# Patient Record
Sex: FEMALE | Employment: FULL TIME | URBAN - METROPOLITAN AREA
[De-identification: names, ages, dates, MRNs, and addresses within clinical notes are randomized per-mention and may not be internally consistent; named-entity substitution may affect disease eponyms.]

---

## 2020-06-12 ENCOUNTER — OFFICE VISIT (OUTPATIENT)
Dept: INTERNAL MEDICINE CLINIC | Age: 45
End: 2020-06-12

## 2020-06-12 DIAGNOSIS — Z00.00 ENCOUNTER FOR MEDICAL EXAMINATION TO ESTABLISH CARE: Primary | ICD-10-CM

## 2020-06-12 DIAGNOSIS — E78.00 HIGH CHOLESTEROL: ICD-10-CM

## 2020-06-12 DIAGNOSIS — F51.01 PRIMARY INSOMNIA: ICD-10-CM

## 2020-06-12 RX ORDER — ESCITALOPRAM OXALATE 5 MG/1
TABLET ORAL
COMMUNITY
Start: 2020-05-02 | End: 2020-06-22 | Stop reason: SDUPTHER

## 2020-06-12 NOTE — PROGRESS NOTES
CC: Establish Care and Sleep Problem      HPI:    She is a 54 y.o. female who presents for evaluation of new patient     She was  last seen on     Patient had lap band surgery 2007 - fluid was removed out of band. Does not cause pain. Eats 800-1000 calories a day. Lost 90 lbs   Gained about 40 lbs   Avoids sugar  Eats protein  Cannot tolerate meat      Hurt back last year and required PT therapy    Noted she has insomnia and not sleeping well for one year. Patient noted irritable and hot all the time  Sleeping 4-5 hours per night   Sometimes over sleeps due to exhaustion   Lexapro 5mg started last winter     During pandemic started taking Nyquil for sleep , and changed to z quil and did not work. Benadryl causes anxiety    Currently taking melatonin - last night went to sleep at 3 AM   C section     Has gynecologist   Has IUD placed 2020  Dr Alejandra Zimmer at [facility]   Having severe pre menopause symptoms    This is an established visit conducted via telemedicine with video. The patient has been instructed that this meets HIPAA criteria and acknowledges and agrees to this method of visitation. Pursuant to the emergency declaration under the [facility], 1135 waiver authority and the [employer] Act, this Virtual Visit was conducted, with patient's consent, to reduce the patient's risk of exposure to COVID-19 and provide continuity of care for an established patient. Services were provided through a video synchronous discussion virtually to substitute for in-person clinic visit. ROS:  Constitutional: negative for fevers, chills, anorexia and weight loss  Eyes:   negative for visual disturbance,  irritation  ENT:   negative for tinnitus,sore throat,nasal congestion,ear pain, sinus pain.    Respiratory:  negative for cough, hemoptysis, dyspnea,wheezing  CV:   negative for chest pain, palpitations, lower extremity edema  GI:   negative for nausea, vomiting, diarrhea, abdominal pain,melena  Genitourinary: negative for frequency, dysuria, hematuria  Musculoskel: negative for myalgias, arthralgias, back pain, muscle weakness, joint pain  Neurological:  negative for headaches, dizziness, focal weakness, numbness  Psych:             Negative for depression and anxiety    History reviewed. No pertinent past medical history. Current Outpatient Medications on File Prior to Visit   Medication Sig Dispense Refill    escitalopram oxalate (LEXAPRO) 5 mg tablet        No current facility-administered medications on file prior to visit. History reviewed. No pertinent surgical history. Family History   Problem Relation Age of Onset    Lung Cancer Father     Diabetes Paternal Grandmother     Pulmonary Fibrosis Mother     High Cholesterol Mother     Hypertension Mother      Reviewed and no changes     Social History     Socioeconomic History    Marital status:      Spouse name: Not on file    Number of children: Not on file    Years of education: Not on file    Highest education level: Not on file   Occupational History    Not on file   Social Needs    Financial resource strain: Not on file    Food insecurity     Worry: Not on file     Inability: Not on file    Transportation needs     Medical: Not on file     Non-medical: Not on file   Tobacco Use    Smoking status: Never Smoker    Smokeless tobacco: Never Used   Substance and Sexual Activity    Alcohol use: Yes    Drug use: Never    Sexual activity: Yes     Partners: Male     Birth control/protection: I.U.D.    Lifestyle    Physical activity     Days per week: Not on file     Minutes per session: Not on file    Stress: Not on file   Relationships    Social connections     Talks on phone: Not on file     Gets together: Not on file     Attends Sikhism service: Not on file     Active member of club or organization: Not on file     Attends meetings of clubs or organizations: Not on file     Relationship status: Not on file    Intimate partner violence     Fear of current or ex partner: Not on file     Emotionally abused: Not on file     Physically abused: Not on file     Forced sexual activity: Not on file   Other Topics Concern    Not on file   Social History Narrative    Not on file          There were no vitals taken for this visit. Physical Examination:   Gen: well appearing female  HEENT: normal conjunctiva, no audible congestion, patient does not see oral erythema, has MMM  Neck: patient does not feel enlarged or tender LAD or masses  Resp: normal respiratory effort, no audible wheezing. CV: patient does not feel palpitations or heart irregularity  Abd: patient does not feel abdominal tenderness or mass, patient does not notice distension  Extrem: patient does not see swelling in ankles or joints. Neuro: Alert and oriented, able to answer questions without difficulty, able to move all extremities and walk normally              Assessment/Plan:    1. Encounter for medical examination to establish care    2. Primary insomnia: onset since perimenopause symptoms started  Tried melatonin and products contining antihistamine without improvement   - suvorexant (BELSOMRA) 10 mg tablet; Take 1 Tab by mouth nightly as needed for Insomnia. Max Daily Amount: 10 mg. Dispense: 30 Tab; Refill: 2  - METABOLIC PANEL, COMPREHENSIVE  - CBC WITH AUTOMATED DIFF    3. High cholesterol  -vegetarian, following a low fat diet  - LIPID PANEL  - METABOLIC PANEL, COMPREHENSIVE  - CBC WITH AUTOMATED DIFF    4. Obesity: had lap band in 07. Eats about 1000 calories a day or less, yet struggles with weight. Patient reports recent TSH check done and normal.   Encouraged to continue with healthy diet. 5. PeriMenopause: started on lexapro 5mg. Feels the lexapro helps.  Also IUD has helped with irregular bleeding  mammogram done at [facility] - last done October 2019    Up todate on pap smear    Last blood work done November     Karen Ramesh MD    This is an established visit conducted via real time video and audio telemedicine. The patient has been instructed that this meets HIPAA criteria and acknowledges and agrees to this method of visitation.     Follow up in 4- 8 weeks for sleep

## 2020-06-12 NOTE — PROGRESS NOTES
Reviewed record in preparation for visit and have obtained necessary documentation. Identified pt with two pt identifiers(name and ). Chief Complaint   Patient presents with   [address]    Sleep Problem       Health Maintenance Due   Topic Date Due    DTaP/Tdap/Td  (1 - Tdap) 1995    Pap Test  1995    Cholesterol Test   2014       Ms. Shannon rainey has a reminder for a \"due or due soon\" health maintenance. I have asked that she discuss this further with her primary care provider for follow-up on this health maintenance. Coordination of Care Questionnaire:  :     1) Have you been to an emergency room, urgent care clinic since your last visit? no   Hospitalized since your last visit? no             2) Have you seen or consulted any other health care providers outside of [address] [place] [address] since your last visit? no  (Include any pap smears or colon screenings in this section.)    3) In the event something were to happen to you and you were unable to speak on your behalf, do you have an Advance Directive/ Living Will in place stating your wishes? NO    Do you have an Advance Directive on file? no    4) Are you interested in receiving information on Advance Directives? NO    Patient is accompanied by self I have received verbal consent from Ashleihg Simmons to discuss any/all medical information while they are present in the room.

## 2020-06-19 ENCOUNTER — TELEPHONE (OUTPATIENT)
Dept: INTERNAL MEDICINE CLINIC | Age: 45
End: 2020-06-19

## 2020-06-19 NOTE — TELEPHONE ENCOUNTER
Patient states she needs a call back to discuss getting her records requested from her former PCP. Patient also needs to advise that she does not have coverage for Belsomra thru Current insurance & is going to wait to start taking or  medication when she gets new insurance in a couple of weeks. Please call to discuss records.  Thank you

## 2020-06-22 RX ORDER — ESCITALOPRAM OXALATE 5 MG/1
5 TABLET ORAL DAILY
Qty: 30 TAB | Refills: 5 | Status: SHIPPED | OUTPATIENT
Start: 2020-06-22 | End: 2020-08-18 | Stop reason: DRUGHIGH

## 2020-06-22 NOTE — TELEPHONE ENCOUNTER
Spoke with patient. Two pt identifiers confirmed. Patient states that she is calling to provide us with the information for her previous PCP to request her records. Patient is also requesting a refill on her lexapro. Patient advised that I will send the refill request to Dr. Clarisse Fuentes and will fax over a request for her records. Pt verbalized understanding of information discussed w/ no further questions at this time.

## 2020-06-22 NOTE — TELEPHONE ENCOUNTER
Patient states she's returning your call in reference to Medical Requests being sent from Former PCP so she can possibly get medications she has been taking in the past & Dr. Cindy Issa has records to see her medication & medical history. Please call to discuss.  Thank you

## 2020-06-22 NOTE — TELEPHONE ENCOUNTER
MD Edel Lara LPN   Caller: Unspecified (3 days ago,  1:14 PM)             Lexapro refilled     Noted

## 2020-08-11 ENCOUNTER — TELEPHONE (OUTPATIENT)
Dept: INTERNAL MEDICINE CLINIC | Age: 45
End: 2020-08-11

## 2020-08-11 NOTE — TELEPHONE ENCOUNTER
Spoke with patient. Two pt identifiers confirmed. Patient states that she has been having a lot of palpitations recently. Patient states that she eats healthy and only has one caffienated beverage daily. Patient states that she is starting to get concerned. Patient states that she would prefer to be seen in the office, does not feel that a virtual visit would be beneficial.  Advised patient that unfortunately none of our physicians have any availability this week, will check with Dr. Lorena Cassidy for recommendations and will give her a call back as soon as possible. Pt verbalized understanding of information discussed w/ no further questions at this time.

## 2020-08-12 NOTE — TELEPHONE ENCOUNTER
MD Jarrod Abdi LPN    Caller: Unspecified [facility], 12:51 PM)               I can see her August 18th at 2 PM in person   No other spots on scheduled      Spoke with patient. Two pt identifiers confirmed. Patient offered an appointment with Dr. Destin De Luna on 08/18/2020. Appointment accepted. Pt verbalized understanding of information discussed w/ no further questions at this time.    Patient advised if anything changes or if unable to keep this appointment to call the office

## 2020-08-18 ENCOUNTER — OFFICE VISIT (OUTPATIENT)
Dept: INTERNAL MEDICINE CLINIC | Age: 45
End: 2020-08-18
Payer: COMMERCIAL

## 2020-08-18 VITALS
HEART RATE: 74 BPM | HEIGHT: 65 IN | RESPIRATION RATE: 18 BRPM | TEMPERATURE: 97.1 F | BODY MASS INDEX: 42.59 KG/M2 | DIASTOLIC BLOOD PRESSURE: 78 MMHG | SYSTOLIC BLOOD PRESSURE: 130 MMHG | WEIGHT: 255.6 LBS | OXYGEN SATURATION: 99 %

## 2020-08-18 DIAGNOSIS — E66.01 OBESITY, MORBID (HCC): ICD-10-CM

## 2020-08-18 DIAGNOSIS — E78.00 HIGH CHOLESTEROL: ICD-10-CM

## 2020-08-18 DIAGNOSIS — R00.2 PALPITATIONS: Primary | ICD-10-CM

## 2020-08-18 DIAGNOSIS — F41.9 ANXIETY: ICD-10-CM

## 2020-08-18 DIAGNOSIS — F51.01 PRIMARY INSOMNIA: ICD-10-CM

## 2020-08-18 PROCEDURE — 99214 OFFICE O/P EST MOD 30 MIN: CPT | Performed by: INTERNAL MEDICINE

## 2020-08-18 PROCEDURE — 93000 ELECTROCARDIOGRAM COMPLETE: CPT | Performed by: INTERNAL MEDICINE

## 2020-08-18 RX ORDER — NORETHINDRONE ACETATE AND ETHINYL ESTRADIOL AND FERROUS FUMARATE 1MG-20(21)
KIT ORAL
COMMUNITY
Start: 2020-07-28 | End: 2021-07-28

## 2020-08-18 RX ORDER — ESCITALOPRAM OXALATE 10 MG/1
10 TABLET ORAL DAILY
Qty: 30 TAB | Refills: 5 | Status: SHIPPED | OUTPATIENT
Start: 2020-08-18 | End: 2021-03-12

## 2020-08-18 NOTE — PROGRESS NOTES
Ms. Ria Marie is presenting for palpitations  CC:  Palpitations       HPI:    55 yo established in June 2020  Patient notes in past 3 weeks has palpitations, feels hot not sleeping well. Patient did not work last week and was able to catch up on her sleep. Had pap smear   Has a Mirena due to heavy bleeding   Discussed hot flashes with gynecologist she is perimenopausal .     The belsomra was not approved and changed insurance and hoping it will be approved     The palpitations come and go, does not have chest pain or dyspnea  Denies exertional chest pain    Has anxiety and for the past 4 days increased the lexapro to 10mg and notes improvement in symptoms. Review of systems:  Constitutional: negative for fever, chills, weight loss, night sweats   Eyes : negative for vision changes, eye pain and discharge  Nose and Throat: negative for tinnitus, sore throat   Cardiovascular: negative for chest pain, and shortness of breath  Respiratory: negative for shortness of breath, cough and wheezing   Gastroinstestinal: negative for abdominal pain, nausea, vomiting, diarrhea, constipation, and blood in the stool  Musculoskeletal: negative for back ache and joint ache   Genitourinary: negative for dysuria, nocturia, polyuria and hematuria   Neurologic: Negative for focal weakness, numbness or incoordination  Skin: negative for rash, pruritus  Hematologic: negative for easy bruising      History reviewed. No pertinent past medical history. History reviewed. No pertinent surgical history. Not on File    Current Outpatient Medications on File Prior to Visit   Medication Sig Dispense Refill    Junel FE 1/20, 28, 1 mg-20 mcg (21)/75 mg (7) tab TAKE 1 TABLET BY MOUTH ONCE DAILY      suvorexant (BELSOMRA) 10 mg tablet Take 1 Tab by mouth nightly as needed for Insomnia. Max Daily Amount: 10 mg. 30 Tab 2     No current facility-administered medications on file prior to visit.         family history includes Diabetes in her paternal grandmother; High Cholesterol in her mother; Hypertension in her mother; Farrell Rivas in her father; Pulmonary Fibrosis in her mother. Social History     Socioeconomic History    Marital status:      Spouse name: Not on file    Number of children: Not on file    Years of education: Not on file    Highest education level: Not on file   Occupational History    Not on file   Social Needs    Financial resource strain: Not on file    Food insecurity     Worry: Not on file     Inability: Not on file    Transportation needs     Medical: Not on file     Non-medical: Not on file   Tobacco Use    Smoking status: Never Smoker    Smokeless tobacco: Never Used   Substance and Sexual Activity    Alcohol use: Yes    Drug use: Never    Sexual activity: Yes     Partners: Male     Birth control/protection: I.U.D. Lifestyle    Physical activity     Days per week: Not on file     Minutes per session: Not on file    Stress: Not on file   Relationships    Social connections     Talks on phone: Not on file     Gets together: Not on file     Attends Lutheran service: Not on file     Active member of club or organization: Not on file     Attends meetings of clubs or organizations: Not on file     Relationship status: Not on file    Intimate partner violence     Fear of current or ex partner: Not on file     Emotionally abused: Not on file     Physically abused: Not on file     Forced sexual activity: Not on file   Other Topics Concern    Not on file   Social History Narrative    Not on file       Visit Vitals  /78 (BP 1 Location: Right arm, BP Patient Position: Sitting)   Pulse 74   Temp 97.1 °F (36.2 °C) (Axillary)   Resp 18   Ht 5' 5\" (1.651 m)   Wt 255 lb 9.6 oz (115.9 kg)   SpO2 99%   BMI 42.53 kg/m²     General:  Well appearing female no acute distress  HEENT:   PERRL,normal conjunctiva. External ear and canals normal, TMs normal.  Hearing normal to voice.   Nose without edema or discharge, normal septum. Lips, teeth, gums normal.  Oropharynx: no erythema, no exudates, no lesions, normal tongue. Neck:  Supple. Thyroid normal size, nontender, without nodules. No carotid bruit. No masses or lymphadenopathy  Respiratory: no respiratory distress,  no wheezing, no rhonchi, no rales. No chest wall tenderness. Cardiovascular:  RRR, normal S1S2, no murmur. Gastrointestinal: normal bowel sounds, soft, nontender, without masses. No hepatosplenomegaly. Extremities +2 pulses, no edema, normal sensation   Musculoskeletal:  Normal gait. Normal digits and nails. Normal strength and tone, no atrophy, and no abnormal movement. Skin:  No rash, no lesions, no ulcers. Skin warm, normal turgor, without induration or nodules. Neuro:  A and OX4, fluent speech, cranial nerves normal 2-12. Sensation normal to light touch. DTR symmetrical  Psych:  Normal affect      No results found for: WBC, WBCLT, HGBPOC, HGB, HGBP, HCTPOC, HCT, PHCT, RBCH, PLT, MCV, HGBEXT, HCTEXT, PLTEXT, HGBEXT, HCTEXT, PLTEXT  No results found for: NA, K, CL, CO2, AGAP, GLU, BUN, CREA, BUCR, GFRAA, GFRNA, CA, GFRAA  No results found for: CHOL, CHOLPOCT, CHOLX, CHLST, CHOLV, HDL, HDLPOC, HDLP, LDL, LDLCPOC, LDLC, DLDLP, VLDLC, VLDL, TGLX, TRIGL, TRIGP, TGLPOCT, CHHD, CHHDX  No results found for: TSH, TSH2, TSH3, TSHP, TSHEXT, TSHEXT  No results found for: HBA1C, HGBE8, LYK2TYOK, AFR8CUJC, DNO1WVYS  No results found for: VITD3, XQVID2, XQVID3, XQVID, VD3RIA          EKG normal sinus rhythm , normal intervals      Assessment and Plan:     1. Palpitations: suspect anxiety, normal EKG and normal exam  - AMB POC EKG ROUTINE W/ 12 LEADS, INTER & REP  - TSH AND FREE T4    2. Primary insomnia-Tried melatonin and products contining antihistamine without improvement     - [facility] approval pending     3. Anxiety  Not well controlled increase lexapro to 10mg   - escitalopram oxalate (LEXAPRO) 10 mg tablet; Take 1 Tab by mouth daily. Dispense: 30 Tab; Refill: 5    4. High cholesterol  Pending lipid panel    5. PeriMenopause: started on lexapro 5mg. Feels the lexapro helps. Also IUD has helped with irregular bleeding  mammogram done at [facility] - last done October 2019    6.  Obesity: hx of lap band, reports healthy diet   Follow-up and Dispositions    · Return in about 5 months (around 1/18/2021) for palpitations and anxiety .                 Sindhu Vazquez MD

## 2020-08-18 NOTE — PROGRESS NOTES
Reviewed record in preparation for visit and have obtained necessary documentation. Identified pt with two pt identifiers(name and ). Chief Complaint   Patient presents with    Palpitations       Health Maintenance Due   Topic Date Due    DTaP/Tdap/Td  (1 - Tdap) 1995    Pap Test  1995    Cholesterol Test   2014    Flu Vaccine  2020       Ms. Shannon rainey has a reminder for a \"due or due soon\" health maintenance. I have asked that she discuss this further with her primary care provider for follow-up on this health maintenance. Coordination of Care Questionnaire:  :     1) Have you been to an emergency room, urgent care clinic since your last visit? no   Hospitalized since your last visit? no             2) Have you seen or consulted any other health care providers outside of [address] [place] [address] since your last visit? no  (Include any pap smears or colon screenings in this section.)    3) In the event something were to happen to you and you were unable to speak on your behalf, do you have an Advance Directive/ Living Will in place stating your wishes? NO    Do you have an Advance Directive on file? no    4) Are you interested in receiving information on Advance Directives? NO    Patient is accompanied by self I have received verbal consent from fredis [facility] to discuss any/all medical information while they are present in the room.

## 2020-08-21 DIAGNOSIS — F51.01 PRIMARY INSOMNIA: ICD-10-CM

## 2020-08-21 NOTE — TELEPHONE ENCOUNTER
Future Appointments:  No future appointments. Last Appointment With Me:  8/18/2020     Last Appointment My Department:  8/18/2020    Requested Prescriptions     Pending Prescriptions Disp Refills    suvorexant (BELSOMRA) 10 mg tablet 30 Tab 2     Sig: Take 1 Tab by mouth nightly as needed for Insomnia. Max Daily Amount: 10 mg.

## 2021-02-09 ENCOUNTER — TELEPHONE (OUTPATIENT)
Dept: INTERNAL MEDICINE CLINIC | Age: 46
End: 2021-02-09

## 2021-02-09 DIAGNOSIS — Z12.11 SCREENING FOR COLON CANCER: Primary | ICD-10-CM

## 2021-02-09 NOTE — TELEPHONE ENCOUNTER
Caller's first and last name: Pt       Reason for call: Would like to be referred to a gastro dr for colonoscopy       Callback required yes/no and why: yes,       Best contact number(s): [phone]       Details to clarify the request: N/A       Ghada Sandhu

## 2021-02-17 ENCOUNTER — TELEPHONE (OUTPATIENT)
Dept: INTERNAL MEDICINE CLINIC | Age: 46
End: 2021-02-17

## 2021-02-17 NOTE — TELEPHONE ENCOUNTER
----- Message from [facility] sent at 2/17/2021  9:00 AM EST -----  Regarding: Dr. Gordo Condon Message/Vendor Calls    Caller's first and last name: Pt      Reason for call: Sent message last week for request for referral to GI doctor and has gotten no response.       Callback required yes/no and why: Yes      Best contact number(s): [phone]      Message from [facility]

## 2021-02-17 NOTE — TELEPHONE ENCOUNTER
Efrem Sofia [address] [facility]  [employer]          Phone:  Fax:      [phone]        Left message for patient with the above referral information.   Patient advised to contact the office if she has any issues getting an appointment or if has any additional questions

## 2021-07-09 ENCOUNTER — TELEPHONE (OUTPATIENT)
Dept: INTERNAL MEDICINE CLINIC | Age: 46
End: 2021-07-09

## 2021-07-09 DIAGNOSIS — R00.2 PALPITATIONS: Primary | ICD-10-CM

## 2021-07-09 NOTE — TELEPHONE ENCOUNTER
Patient states she needs a call back to get the Name of a Cardiologist that Dr. Cam Campa would refer her to. Please call to discuss. Patient stats a detailed message can be left on her voice mail.  Thank you

## 2021-07-09 NOTE — TELEPHONE ENCOUNTER
This attempted to contact patient in reference to cardiology recommendation per Dr. Tom Concepcion. This writer was not able to reach patient at this time, a voicemail message was left for patient to contact the office.

## 2021-07-28 ENCOUNTER — CLINICAL SUPPORT (OUTPATIENT)
Dept: CARDIOLOGY CLINIC | Age: 46
End: 2021-07-28
Payer: COMMERCIAL

## 2021-07-28 ENCOUNTER — OFFICE VISIT (OUTPATIENT)
Dept: CARDIOLOGY CLINIC | Age: 46
End: 2021-07-28

## 2021-07-28 VITALS
BODY MASS INDEX: 43.45 KG/M2 | HEART RATE: 93 BPM | SYSTOLIC BLOOD PRESSURE: 130 MMHG | OXYGEN SATURATION: 98 % | WEIGHT: 260.8 LBS | DIASTOLIC BLOOD PRESSURE: 80 MMHG | HEIGHT: 65 IN | RESPIRATION RATE: 18 BRPM

## 2021-07-28 DIAGNOSIS — R00.2 PALPITATIONS: ICD-10-CM

## 2021-07-28 DIAGNOSIS — R06.09 DOE (DYSPNEA ON EXERTION): Primary | ICD-10-CM

## 2021-07-28 DIAGNOSIS — R07.89 CHEST DISCOMFORT: ICD-10-CM

## 2021-07-28 DIAGNOSIS — R06.09 DOE (DYSPNEA ON EXERTION): ICD-10-CM

## 2021-07-28 DIAGNOSIS — Z76.89 ENCOUNTER TO ESTABLISH CARE: ICD-10-CM

## 2021-07-28 PROCEDURE — 99204 OFFICE O/P NEW MOD 45 MIN: CPT | Performed by: INTERNAL MEDICINE

## 2021-07-28 PROCEDURE — 93000 ELECTROCARDIOGRAM COMPLETE: CPT | Performed by: INTERNAL MEDICINE

## 2021-07-28 PROCEDURE — 93227 XTRNL ECG REC<48 HR R&I: CPT | Performed by: INTERNAL MEDICINE

## 2021-07-28 PROCEDURE — 93225 XTRNL ECG REC<48 HRS REC: CPT | Performed by: INTERNAL MEDICINE

## 2021-07-28 NOTE — LETTER
7/28/2021    Patient: Aime Luo   YOB: 1975   Date of Visit: 7/28/2021     Elisabeth Kidd MD  [address]    Dear Elisabeth Kidd MD,      Thank you for referring Ms. Aime Luo to [facility] for evaluation. My notes for this consultation are attached. If you have questions, please do not hesitate to call me. I look forward to following your patient along with you.       Sincerely,    Seth Nick MD

## 2021-07-28 NOTE — PROGRESS NOTES
1. Have you been to the ER, urgent care clinic since your last visit? Hospitalized since your last visit? No    2. Have you seen or consulted any other health care providers outside of the [address] [place] [address] since your last visit? Include any pap smears or colon screening. No     Chief Complaint   Patient presents with    Palpitations     Pain and heaviness in shoulders, SOB, fatgiue with mild exertion.

## 2021-07-28 NOTE — PROGRESS NOTES
Leslie Bonner NP         Subjective/HPI:     Robb Walsh is a 51 y.o. female is here for new patient consultation. The patient has medical hx significant for obesity s/p lap band surgery and anxiety. She reports she is having more frequent episodes of chest pressure that is radiating down her left or right arm. It is not exertionally related, happens randomly. She has noticed worsening QUINTANA with shorter distances. Having to take rest breaks when active which is new. She also feels like she has to take deep breaths often. She is having racing heart rates that occur randomly. She reports this has been going on now for a couple months. She denies changes in stress level, caffeine intake. She did have an increase in her Lexapro to help with her night sweats/perimenopausal symptoms which helped minimally. She reports walking a mile every day consistently and she is able to tolerate this without symptoms. She is concerned d/t her mother having pulmonary fibrosis and required pacemaker. Previous cardiac evaluation includes none. Current Outpatient Medications   Medication Sig    escitalopram oxalate (LEXAPRO) 10 mg tablet Take 1 tablet by mouth once daily     No current facility-administered medications for this visit. Vitals:    07/28/21 1037   BP: 130/80   Pulse: 93   Resp: 18   SpO2: 98%   Weight: 260 lb 12.8 oz (118.3 kg)   Height: 5' 5\" (1.651 m)       I have reviewed the nurses notes, vitals, problem list, allergy list, medical history, family, social history and medications. Review of Symptoms:  General: Pt denies excessive weight gain or loss. Pt is able to conduct ADL's.+fatigue  HEENT: Denies blurred vision, headaches, epistaxis and difficulty swallowing. Respiratory: + shortness of breath, QUINTANA, denies wheezing or stridor.   Cardiovascular: +chest pressure/heaviness with radiation down left or right arm.+ palpitations, denies edema or PND  Gastrointestinal: Denies poor appetite, indigestion, abdominal pain or blood in stool  Urinary: Denies dysuria, pyuria  Musculoskeletal: Denies pain or swelling from muscles or joints  Neurologic: Denies tremor, paresthesias, or sensory motor disturbance  Skin: Denies rash, itching or texture change. Psych: Denies depression        Physical Exam:      General: Well developed, cooperative, alert in no acute distress, appears states age. HEENT: Supple, No carotid bruits, no JVD, trach is midline. PERRL, EOM intact  Heart:  Normal S1/S2 negative S3 or S4. Regular, no murmur, gallop or rub. Respiratory: Clear bilaterally x 4, no wheezing or rales  Abdomen:   Soft, non-tender, no masses, bowel sounds are active. Extremities:  No edema, normal cap refill, no cyanosis, atraumatic. Neuro: A&Ox3, speech clear, gait stable. Skin: Skin color is normal. No rashes or lesions. Non diaphoretic  Vascular: 2+ pulses symmetric in all extremities    Cardiographics    ECG: Sinus  Rhythm   Low voltage in precordial leads.    -Decreasing R-wave progression -may be secondary to pulmonary disease        Assessment:      Diagnoses and all orders for this visit:    1. QUINTANA (dyspnea on exertion)  -     CARDIAC HOLTER MONITOR; Future  -     ECHO ADULT COMPLETE; Future  -     METABOLIC PANEL, COMPREHENSIVE  -     MAGNESIUM  -     CBC WITH AUTOMATED DIFF  -     TSH REFLEX TO T4  -     ECHO STRESS; Future    2. Chest discomfort  -     CARDIAC HOLTER MONITOR; Future  -     ECHO ADULT COMPLETE; Future  -     METABOLIC PANEL, COMPREHENSIVE  -     MAGNESIUM  -     CBC WITH AUTOMATED DIFF  -     TSH REFLEX TO T4  -     ECHO STRESS; Future    3. Palpitations  -     CARDIAC HOLTER MONITOR; Future  -     ECHO ADULT COMPLETE; Future  -     METABOLIC PANEL, COMPREHENSIVE  -     MAGNESIUM  -     CBC WITH AUTOMATED DIFF  -     TSH REFLEX TO T4  -     ECHO STRESS; Future    4. BMI 40.0-44.9, adult ([facility] [address]  -     CARDIAC HOLTER MONITOR;  Future  -     ECHO ADULT COMPLETE; Future  -     METABOLIC PANEL, COMPREHENSIVE  -     MAGNESIUM  -     CBC WITH AUTOMATED DIFF  -     TSH REFLEX TO T4  -     ECHO STRESS; Future    5. Encounter to establish care  -     AMB POC EKG ROUTINE W/ 12 LEADS, INTER & REP  -     CARDIAC HOLTER MONITOR; Future  -     ECHO ADULT COMPLETE; Future  -     METABOLIC PANEL, COMPREHENSIVE  -     MAGNESIUM  -     CBC WITH AUTOMATED DIFF  -     TSH REFLEX TO T4  -     ECHO STRESS; Future      Specialty Problems     None          ICD-10-CM ICD-9-CM    1. QUINTANA (dyspnea on exertion)  R06.00 786.09 CARDIAC HOLTER MONITOR      ECHO ADULT COMPLETE      METABOLIC PANEL, COMPREHENSIVE      MAGNESIUM      CBC WITH AUTOMATED DIFF      TSH REFLEX TO T4      ECHO STRESS   2. Chest discomfort  R07.89 786.59 CARDIAC HOLTER MONITOR      ECHO ADULT COMPLETE      METABOLIC PANEL, COMPREHENSIVE      MAGNESIUM      CBC WITH AUTOMATED DIFF      TSH REFLEX TO T4      ECHO STRESS   3. Palpitations  R00.2 785.1 CARDIAC HOLTER MONITOR      ECHO ADULT COMPLETE      METABOLIC PANEL, COMPREHENSIVE      MAGNESIUM      CBC WITH AUTOMATED DIFF      TSH REFLEX TO T4      ECHO STRESS   4. BMI 40.0-44.9, adult ([facility])  Z68.41 V85.41 CARDIAC HOLTER MONITOR      ECHO ADULT COMPLETE      METABOLIC PANEL, COMPREHENSIVE      MAGNESIUM      CBC WITH AUTOMATED DIFF      TSH REFLEX TO T4      ECHO STRESS   5. Encounter to establish care  Z76.89 V65.8 AMB POC EKG ROUTINE W/ 12 LEADS, INTER & REP      CARDIAC HOLTER MONITOR      ECHO ADULT COMPLETE      METABOLIC PANEL, COMPREHENSIVE      MAGNESIUM      CBC WITH AUTOMATED DIFF      TSH REFLEX TO T4      ECHO STRESS         PLAN:  1. QUINTANA/SOB  Reports worsening QUINTANA and feeling like she has to take deep breaths. EKG SR with low voltage and decreasing R wave progression. Mother with pulm fibrosis. No previous cardiac evaluation. Obtain echo    2. Chest presure/heaviness with radiation down arms  Reports episodes of chest pressure and heaviness that radiates down left or right arms.  Risk factors for CAD include age, female, and obesity. EKG with changes per above. Obtain stress echo. 3. Palpitations  Rapid HR that is occurring randomly and with SOB. Obtain 5 day holter. Obtain labs including CBC, CMP, Mag and TSH    Patient seen and examined by me with nurse practitioner. Debbie Ca personally performed all components of the history, physical, and medical decision making and agree with the assessment and plan with minor modifications as noted. Agree with monitor for palps, echo for QUINTANA, and stress echo for chest discomfort. F/U after studies    Leslie Gamez MD    Patient was made aware during visit today that all testing completed would be instantaneously available on their MyChart for review. Discussed that these results will be made available to the provider at the same time. They were advised to wait at least 3 business days to allow for provider's interpretation of results with follow-up before calling our office with concerns about their results.

## 2021-08-09 ENCOUNTER — TELEPHONE (OUTPATIENT)
Dept: CARDIOLOGY CLINIC | Age: 46
End: 2021-08-09

## 2021-08-09 NOTE — TELEPHONE ENCOUNTER
----- Message from Liz Ardon NP sent at 8/9/2021  8:31 AM EDT -----  Monitor shows all normal heart rhythm. She had rare early beats from the top chamber of her heart which is considered a normal finding and is benign. We all have these.

## 2021-08-09 NOTE — PROGRESS NOTES
Monitor shows all normal heart rhythm. She had rare early beats from the top chamber of her heart which is considered a normal finding and is benign. We all have these.

## 2021-08-09 NOTE — TELEPHONE ENCOUNTER
Spoke with patient. Verified patient with two patient identifiers. Discussed monitor results with pt. All okay. Patient verbalized understanding.

## 2021-08-09 NOTE — TELEPHONE ENCOUNTER
----- Message from Yonis Antoine NP sent at 8/9/2021  8:31 AM EDT -----  Monitor shows all normal heart rhythm. She had rare early beats from the top chamber of her heart which is considered a normal finding and is benign. We all have these.

## 2021-08-25 ENCOUNTER — ANCILLARY PROCEDURE (OUTPATIENT)
Dept: CARDIOLOGY CLINIC | Age: 46
End: 2021-08-25
Payer: COMMERCIAL

## 2021-08-25 VITALS
HEIGHT: 65 IN | DIASTOLIC BLOOD PRESSURE: 80 MMHG | SYSTOLIC BLOOD PRESSURE: 130 MMHG | BODY MASS INDEX: 43.32 KG/M2 | WEIGHT: 260 LBS

## 2021-08-25 DIAGNOSIS — R00.2 PALPITATIONS: ICD-10-CM

## 2021-08-25 DIAGNOSIS — R06.09 DOE (DYSPNEA ON EXERTION): ICD-10-CM

## 2021-08-25 DIAGNOSIS — R07.89 CHEST DISCOMFORT: ICD-10-CM

## 2021-08-25 DIAGNOSIS — Z76.89 ENCOUNTER TO ESTABLISH CARE: ICD-10-CM

## 2021-08-25 PROCEDURE — 93306 TTE W/DOPPLER COMPLETE: CPT | Performed by: INTERNAL MEDICINE

## 2021-08-26 ENCOUNTER — TELEPHONE (OUTPATIENT)
Dept: CARDIOLOGY CLINIC | Age: 46
End: 2021-08-26

## 2021-08-26 LAB
ECHO AO ASC DIAM: 3.72 CM
ECHO AO ROOT DIAM: 2.99 CM
ECHO AV PEAK GRADIENT: 4.71 MMHG
ECHO AV PEAK VELOCITY: 108.56 CM/S
ECHO EST RA PRESSURE: 3 MMHG
ECHO LA AREA 4C: 12.69 CM2
ECHO LA MAJOR AXIS: 3.45 CM
ECHO LA MINOR AXIS: 1.56 CM
ECHO LA VOL 2C: 15.17 ML (ref 22–52)
ECHO LA VOL 4C: 25.08 ML (ref 22–52)
ECHO LA VOL BP: 24.71 ML (ref 22–52)
ECHO LA VOL/BSA BIPLANE: 11.18 ML/M2 (ref 16–28)
ECHO LA VOLUME INDEX A2C: 6.86 ML/M2 (ref 16–28)
ECHO LA VOLUME INDEX A4C: 11.35 ML/M2 (ref 16–28)
ECHO LV E' LATERAL VELOCITY: 12.85 CM/S
ECHO LV E' SEPTAL VELOCITY: 6.16 CM/S
ECHO LV GLOBAL LONGITUDINAL STRAIN (GLS): -15.5 PERCENT
ECHO LV INTERNAL DIMENSION DIASTOLIC: 4.49 CM (ref 3.9–5.3)
ECHO LV INTERNAL DIMENSION SYSTOLIC: 2.84 CM
ECHO LV ISOVOLUMETRIC RELAXATION TIME (IVRT): 64.7 MS
ECHO LV IVSD: 0.9 CM (ref 0.6–0.9)
ECHO LV MASS 2D: 129.4 G (ref 67–162)
ECHO LV MASS INDEX 2D: 58.5 G/M2 (ref 43–95)
ECHO LV POSTERIOR WALL DIASTOLIC: 0.87 CM (ref 0.6–0.9)
ECHO LVOT PEAK GRADIENT: 3.63 MMHG
ECHO LVOT PEAK VELOCITY: 95.2 CM/S
ECHO MV "A" WAVE DURATION: 137.01 MS
ECHO MV A VELOCITY: 67.84 CM/S
ECHO MV E DECELERATION TIME (DT): 304.5 MS
ECHO MV E VELOCITY: 47.91 CM/S
ECHO MV E/A RATIO: 0.71
ECHO MV E/E' LATERAL: 3.73
ECHO MV E/E' RATIO (AVERAGED): 5.75
ECHO MV E/E' SEPTAL: 7.78
ECHO RIGHT VENTRICULAR SYSTOLIC PRESSURE (RVSP): 14.36 MMHG
ECHO RV TAPSE: 2.23 CM (ref 1.5–2)
ECHO TV REGURGITANT MAX VELOCITY: 168.53 CM/S
ECHO TV REGURGITANT PEAK GRADIENT: 11.36 MMHG
GLOBAL LONGITUDINAL STRAIN 2 CHAMBER: -16.2 PERCENT
GLOBAL LONGITUDINAL STRAIN 4 CHAMBER: -15.2 PERCENT
GLOBAL LONGITUDINAL STRAIN LONG AXIS: -15 PERCENT
LA VOL DISK BP: 23.05 ML (ref 22–52)

## 2021-08-26 NOTE — TELEPHONE ENCOUNTER
----- Message from Adele German NP sent at 8/26/2021  3:44 PM EDT -----  Echo showed normal pumping heart strength, valves looks goof and healthy.

## 2021-08-30 ENCOUNTER — ANCILLARY PROCEDURE (OUTPATIENT)
Dept: CARDIOLOGY CLINIC | Age: 46
End: 2021-08-30
Payer: COMMERCIAL

## 2021-08-30 VITALS
SYSTOLIC BLOOD PRESSURE: 130 MMHG | HEIGHT: 65 IN | WEIGHT: 260 LBS | BODY MASS INDEX: 43.32 KG/M2 | DIASTOLIC BLOOD PRESSURE: 80 MMHG

## 2021-08-30 DIAGNOSIS — R06.09 DOE (DYSPNEA ON EXERTION): ICD-10-CM

## 2021-08-30 DIAGNOSIS — R07.89 CHEST DISCOMFORT: ICD-10-CM

## 2021-08-30 DIAGNOSIS — Z76.89 ENCOUNTER TO ESTABLISH CARE: ICD-10-CM

## 2021-08-30 DIAGNOSIS — R00.2 PALPITATIONS: ICD-10-CM

## 2021-08-30 LAB
STRESS BASELINE DIAS BP: 85 MMHG
STRESS BASELINE HR: 92 BPM
STRESS BASELINE SYS BP: 125 MMHG
STRESS ESTIMATED WORKLOAD: 9 METS
STRESS EXERCISE DUR MIN: NORMAL MIN:SEC
STRESS O2 SAT PEAK: 94 %
STRESS O2 SAT REST: 97 %
STRESS PEAK DIAS BP: 95 MMHG
STRESS PEAK SYS BP: 185 MMHG
STRESS PERCENT HR ACHIEVED: 96 %
STRESS POST PEAK HR: 166 BPM
STRESS RATE PRESSURE PRODUCT: NORMAL BPM*MMHG
STRESS ST DEPRESSION: 0 MM
STRESS ST ELEVATION: 0 MM
STRESS STAGE 1 BP: NORMAL MMHG
STRESS STAGE 1 DURATION: NORMAL MIN:SEC
STRESS STAGE 1 HR: 141 BPM
STRESS STAGE 2 BP: NORMAL MMHG
STRESS STAGE 2 DURATION: NORMAL MIN:SEC
STRESS STAGE 2 HR: 160 BPM
STRESS STAGE 3 DURATION: NORMAL MIN:SEC
STRESS STAGE 3 HR: 166 BPM
STRESS STAGE RECOVERY 1 BP: NORMAL MMHG
STRESS STAGE RECOVERY 1 DURATION: NORMAL MIN:SEC
STRESS STAGE RECOVERY 1 HR: 107 BPM
STRESS TARGET HR: 173 BPM

## 2021-08-30 PROCEDURE — 93351 STRESS TTE COMPLETE: CPT | Performed by: INTERNAL MEDICINE

## 2021-08-30 NOTE — TELEPHONE ENCOUNTER
Patient cannot answer phone during the day. Could you send her results via [employer]?     Thanks,  Sherell Hammans

## 2021-09-08 ENCOUNTER — TELEPHONE (OUTPATIENT)
Dept: CARDIOLOGY CLINIC | Age: 46
End: 2021-09-08

## 2021-09-08 NOTE — TELEPHONE ENCOUNTER
----- Message from Aundrea Conn LPN sent at 3/57/9616  5:39 PM EDT -----  Pls call pt.  ----- Message -----  From: Dieter Shahid NP  Sent: 8/30/2021   4:36 PM EDT  To: Aundrea Conn LPN    Please call the patient and inform that stress test is normal.  Low concern for significant blockages in the heart arteries at this time.     Thanks,  [employer]

## 2021-09-28 DIAGNOSIS — F41.9 ANXIETY: ICD-10-CM

## 2021-09-28 RX ORDER — ESCITALOPRAM OXALATE 10 MG/1
TABLET ORAL
Qty: 30 TABLET | Refills: 0 | Status: SHIPPED | OUTPATIENT
Start: 2021-09-28 | End: 2021-11-02

## 2021-11-02 DIAGNOSIS — F41.9 ANXIETY: ICD-10-CM

## 2021-11-02 RX ORDER — ESCITALOPRAM OXALATE 10 MG/1
TABLET ORAL
Qty: 30 TABLET | Refills: 0 | Status: SHIPPED | OUTPATIENT
Start: 2021-11-02 | End: 2021-12-05

## 2021-12-05 DIAGNOSIS — F41.9 ANXIETY: ICD-10-CM

## 2021-12-05 RX ORDER — ESCITALOPRAM OXALATE 10 MG/1
TABLET ORAL
Qty: 30 TABLET | Refills: 0 | Status: SHIPPED | OUTPATIENT
Start: 2021-12-05 | End: 2022-01-06

## 2022-01-06 DIAGNOSIS — F41.9 ANXIETY: ICD-10-CM

## 2022-01-06 RX ORDER — ESCITALOPRAM OXALATE 10 MG/1
TABLET ORAL
Qty: 30 TABLET | Refills: 2 | Status: SHIPPED | OUTPATIENT
Start: 2022-01-06 | End: 2022-01-18 | Stop reason: SDUPTHER

## 2022-01-07 ENCOUNTER — TELEPHONE (OUTPATIENT)
Dept: CARDIOLOGY CLINIC | Age: 47
End: 2022-01-07

## 2022-01-07 NOTE — TELEPHONE ENCOUNTER
LMVM called to remind pt to get labs done CBC, CMP, Magnesium and TSH no future appt scheduled with Bo Cuevas yet. Mailed postcard.

## 2022-01-18 ENCOUNTER — VIRTUAL VISIT (OUTPATIENT)
Dept: INTERNAL MEDICINE CLINIC | Age: 47
End: 2022-01-18
Payer: COMMERCIAL

## 2022-01-18 DIAGNOSIS — E66.01 OBESITY, CLASS III, BMI 40-49.9 (MORBID OBESITY) (HCC): ICD-10-CM

## 2022-01-18 DIAGNOSIS — F41.9 ANXIETY: Primary | ICD-10-CM

## 2022-01-18 DIAGNOSIS — N95.9 POSTMENOPAUSAL SYMPTOMS: ICD-10-CM

## 2022-01-18 PROCEDURE — 99214 OFFICE O/P EST MOD 30 MIN: CPT | Performed by: INTERNAL MEDICINE

## 2022-01-18 RX ORDER — ESCITALOPRAM OXALATE 10 MG/1
10 TABLET ORAL DAILY
Qty: 90 TABLET | Refills: 1 | Status: SHIPPED | OUTPATIENT
Start: 2022-01-18 | End: 2022-05-10 | Stop reason: SDUPTHER

## 2022-01-18 RX ORDER — FEXOFENADINE HCL AND PSEUDOEPHEDRINE HCI 180; 240 MG/1; MG/1
1 TABLET, EXTENDED RELEASE ORAL DAILY
COMMUNITY

## 2022-01-18 NOTE — PROGRESS NOTES
CC: Follow-up and Medication Refill      HPI:    She is a 51 y.o. female who presents for evaluation of perimenopause symptoms     She was started on lexapro and noted improvement  Currently on lexapro 10mg daily   When runs out notes symptoms of mood swings and notes hot flashes  Takes melatonin for sleep 10mg   Takes a few hours to fall asleep       Patient had lap band surgery 2007 - fluid was removed out of band. Does not cause pain. Eats 3 meals a day   Avoids sugar  Current weight is 260 lbs   Eats protein  Cannot tolerate meat        Working from home   Sees gyn   Has IUD          This is an established visit conducted via telemedicine with video. The patient has been instructed that this meets HIPAA criteria and acknowledges and agrees to this method of visitation. Pursuant to the emergency declaration under the [facility] waiver authority and the [employer] Act, this Virtual Visit was conducted, with patient's consent, to reduce the patient's risk of exposure to COVID-19 and provide continuity of care for an established patient. Services were provided through a video synchronous discussion virtually to substitute for in-person clinic visit. ROS:  Constitutional: negative for fevers, chills, anorexia and weight loss  10 systems reviewed and negative other then HPI     Past Medical History:   Diagnosis Date    Anxiety     Insomnia     Obesity        Current Outpatient Medications on File Prior to Visit   Medication Sig Dispense Refill    fexofenadine-pseudoephedrine (Allegra-D 24 Hour) 180-240 mg per tablet Take 1 Tablet by mouth daily.  escitalopram oxalate (LEXAPRO) 10 mg tablet Take 1 tablet by mouth once daily 30 Tablet 2     No current facility-administered medications on file prior to visit.        Past Surgical History:   Procedure Laterality Date    HX OTHER SURGICAL      lap band surgery       Family History   Problem Relation Age of Onset    Lung Cancer Father     Diabetes Paternal Grandmother     Pulmonary Fibrosis Mother     High Cholesterol Mother     Hypertension Mother     Pacemaker Mother     Heart Attack Paternal Grandfather      Reviewed and no changes     Social History     Socioeconomic History    Marital status:      Spouse name: Not on file    Number of children: Not on file    Years of education: Not on file    Highest education level: Not on file   Occupational History    Not on file   Tobacco Use    Smoking status: Never Smoker    Smokeless tobacco: Never Used   Substance and Sexual Activity    Alcohol use: Yes    Drug use: Never    Sexual activity: Yes     Partners: Male     Birth control/protection: I.U.D. Other Topics Concern    Not on file   Social History Narrative    Not on file     Social Determinants of Health     Financial Resource Strain:     Difficulty of Paying Living Expenses: Not on file   Food Insecurity:     Worried About Running Out of Food in the Last Year: Not on file    Linda of Food in the Last Year: Not on file   Transportation Needs:     Lack of Transportation (Medical): Not on file    Lack of Transportation (Non-Medical):  Not on file   Physical Activity:     Days of Exercise per Week: Not on file    Minutes of Exercise per Session: Not on file   Stress:     Feeling of Stress : Not on file   Social Connections:     Frequency of Communication with Friends and Family: Not on file    Frequency of Social Gatherings with Friends and Family: Not on file    Attends Latter-day Services: Not on file    Active Member of Clubs or Organizations: Not on file    Attends Club or Organization Meetings: Not on file    Marital Status: Not on file   Intimate Partner Violence:     Fear of Current or Ex-Partner: Not on file    Emotionally Abused: Not on file    Physically Abused: Not on file    Sexually Abused: Not on file Housing Stability:     Unable to Pay for Housing in the Last Year: Not on file    Number of Places Lived in the Last Year: Not on file    Unstable Housing in the Last Year: Not on file          There were no vitals taken for this visit. Physical Examination:   Gen: well appearing female  HEENT: normal conjunctiva, no audible congestion, patient does not see oral erythema, has MMM  Neck: patient does not feel enlarged or tender LAD or masses  Resp: normal respiratory effort, no audible wheezing. CV: patient does not feel palpitations or heart irregularity  Abd: patient does not feel abdominal tenderness or mass, patient does not notice distension  Extrem: patient does not see swelling in ankles or joints. Neuro: Alert and oriented, able to answer questions without difficulty, able to move all extremities and walk normally            Assessment/Plan:    1. Anxiety    2. Postmenopausal symptoms  - doing well on lexapro     3. Obesity: s/p lap band surgery weight is stable and working on diet . She does not have the finances to proceed with gastric bypass surgery    Labs are scheduled with Dr Garrett Lesch, MD    This is an established visit conducted via real time video and audio telemedicine. The patient has been instructed that this meets HIPAA criteria and acknowledges and agrees to this method of visitation.

## 2022-01-18 NOTE — PROGRESS NOTES
Identified pt with two pt identifiers(name and ). Reviewed record in preparation for visit and have obtained necessary documentation.   Chief Complaint   Patient presents with    Follow-up    Medication Refill        Genaro Torres, 64 [address] [facility]  [address] [place] [address], [address] [facility], [address] [place] [address]  W: [phone]  F: [phone]

## 2022-03-18 PROBLEM — E66.01 OBESITY, MORBID (HCC): Status: ACTIVE | Noted: 2020-08-18

## 2022-05-04 ENCOUNTER — TELEPHONE (OUTPATIENT)
Dept: INTERNAL MEDICINE CLINIC | Age: 47
End: 2022-05-04

## 2022-05-04 DIAGNOSIS — E78.00 HIGH CHOLESTEROL: ICD-10-CM

## 2022-05-04 DIAGNOSIS — Z00.00 GENERAL MEDICAL EXAM: Primary | ICD-10-CM

## 2022-05-10 ENCOUNTER — VIRTUAL VISIT (OUTPATIENT)
Dept: INTERNAL MEDICINE CLINIC | Age: 47
End: 2022-05-10
Payer: COMMERCIAL

## 2022-05-10 DIAGNOSIS — F41.9 ANXIETY: ICD-10-CM

## 2022-05-10 DIAGNOSIS — N95.9 POSTMENOPAUSAL SYMPTOMS: Primary | ICD-10-CM

## 2022-05-10 PROCEDURE — 99214 OFFICE O/P EST MOD 30 MIN: CPT | Performed by: INTERNAL MEDICINE

## 2022-05-10 RX ORDER — ESCITALOPRAM OXALATE 10 MG/1
10 TABLET ORAL 2 TIMES DAILY
Qty: 180 TABLET | Refills: 1 | Status: SHIPPED | OUTPATIENT
Start: 2022-05-10 | End: 2022-06-19 | Stop reason: SDUPTHER

## 2022-05-10 NOTE — PROGRESS NOTES
CC: Medication Evaluation, Depression, and Follow-up      HPI:    She is a 49 y.o. female who presents for evaluation of perimenopause symptoms     Depression hot flashes   Patient notes that if she takes lexapro in AM 10mg she did not have hot flashes in the afternoon   Currently on lexapro 10mg at night time   When she tried BID noted better mood, better sleep and less hot flashes      Takes melatonin for sleep 10mg   Takes a few hours to fall asleep       Patient had lap band surgery 2007 - fluid was removed out of band. Pain resolved. Eats 3 meals a day   Avoids sugar  Current weight is 260 lbs   Eats protein  Cannot tolerate meat        Working from home   Sees gyn   Has IUD          This is an established visit conducted via telemedicine with video. The patient has been instructed that this meets HIPAA criteria and acknowledges and agrees to this method of visitation. Pursuant to the emergency declaration under the [facility] waiver authority and the [employer] Act, this Virtual Visit was conducted, with patient's consent, to reduce the patient's risk of exposure to COVID-19 and provide continuity of care for an established patient. Services were provided through a video synchronous discussion virtually to substitute for in-person clinic visit. ROS:  Constitutional: negative for fevers, chills, anorexia and weight loss  10 systems reviewed and negative other then HPI     Past Medical History:   Diagnosis Date    Anxiety     Insomnia     Obesity        Current Outpatient Medications on File Prior to Visit   Medication Sig Dispense Refill    fexofenadine-pseudoephedrine (Allegra-D 24 Hour) 180-240 mg per tablet Take 1 Tablet by mouth daily. No current facility-administered medications on file prior to visit.        Past Surgical History:   Procedure Laterality Date    HX OTHER SURGICAL lap band surgery       Family History   Problem Relation Age of Onset    Lung Cancer Father     Diabetes Paternal Grandmother     Pulmonary Fibrosis Mother     High Cholesterol Mother     Hypertension Mother     Pacemaker Mother     Heart Attack Paternal Grandfather      Reviewed and no changes     Social History     Socioeconomic History    Marital status:      Spouse name: Not on file    Number of children: Not on file    Years of education: Not on file    Highest education level: Not on file   Occupational History    Not on file   Tobacco Use    Smoking status: Never Smoker    Smokeless tobacco: Never Used   Substance and Sexual Activity    Alcohol use: Yes    Drug use: Never    Sexual activity: Yes     Partners: Male     Birth control/protection: I.U.D. Other Topics Concern    Not on file   Social History Narrative    Not on file     Social Determinants of Health     Financial Resource Strain:     Difficulty of Paying Living Expenses: Not on file   Food Insecurity:     Worried About Running Out of Food in the Last Year: Not on file    Linda of Food in the Last Year: Not on file   Transportation Needs:     Lack of Transportation (Medical): Not on file    Lack of Transportation (Non-Medical):  Not on file   Physical Activity:     Days of Exercise per Week: Not on file    Minutes of Exercise per Session: Not on file   Stress:     Feeling of Stress : Not on file   Social Connections:     Frequency of Communication with Friends and Family: Not on file    Frequency of Social Gatherings with Friends and Family: Not on file    Attends Evangelical Services: Not on file    Active Member of Clubs or Organizations: Not on file    Attends Club or Organization Meetings: Not on file    Marital Status: Not on file   Intimate Partner Violence:     Fear of Current or Ex-Partner: Not on file    Emotionally Abused: Not on file    Physically Abused: Not on file    Sexually Abused: Not on file   Housing Stability:     Unable to Pay for Housing in the Last Year: Not on file    Number of Places Lived in the Last Year: Not on file    Unstable Housing in the Last Year: Not on file          There were no vitals taken for this visit. Physical Examination:   Gen: well appearing female  HEENT: normal conjunctiva, no audible congestion, patient does not see oral erythema, has MMM  Neck: patient does not feel enlarged or tender LAD or masses  Resp: normal respiratory effort, no audible wheezing. CV: patient does not feel palpitations or heart irregularity  Abd: patient does not feel abdominal tenderness or mass, patient does not notice distension  Extrem: patient does not see swelling in ankles or joints. Neuro: Alert and oriented, able to answer questions without difficulty, able to move all extremities and walk normally            Assessment/Plan:    1. Anxiety    2. Postmenopausal symptoms  - notes mood is better and less hot flashes if takes lexapro 10mg BID   Therefore will prescribe in that way  Orders Placed This Encounter    escitalopram oxalate (LEXAPRO) 10 mg tablet     Sig: Take 1 Tablet by mouth two (2) times a day. Dispense:  180 Tablet     Refill:  1       3. Obesity: s/p lap band surgery weight is stable and working on diet . She does not have the finances to proceed with gastric bypass surgery        Reminded to do labs     Follow-up and Dispositions    · Return in about 6 months (around 11/10/2022). Fadia Moore MD    This is an established visit conducted via real time video and audio telemedicine. The patient has been instructed that this meets HIPAA criteria and acknowledges and agrees to this method of visitation.

## 2022-05-10 NOTE — PROGRESS NOTES
1. \"Have you been to the ER, urgent care clinic since your last visit? Hospitalized since your last visit? \" No    2. \"Have you seen or consulted any other health care providers outside of the [address] [place] [address] since your last visit? \" No     3. For patients aged 39-70: Has the patient had a colonoscopy / FIT/ Cologuard? Yes - no Care Gap present      If the patient is female:    4. For patients aged 41-77: Has the patient had a mammogram within the past 2 years? Yes - no Care Gap present      5. For patients aged 21-65: Has the patient had a pap smear?  Yes - no Care Gap present

## 2022-06-19 DIAGNOSIS — F41.9 ANXIETY: ICD-10-CM

## 2022-06-19 DIAGNOSIS — N95.9 POSTMENOPAUSAL SYMPTOMS: ICD-10-CM

## 2022-06-20 RX ORDER — ESCITALOPRAM OXALATE 10 MG/1
10 TABLET ORAL 2 TIMES DAILY
Qty: 180 TABLET | Refills: 1 | Status: SHIPPED | OUTPATIENT
Start: 2022-06-20 | End: 2022-06-29 | Stop reason: SDUPTHER

## 2022-06-20 NOTE — TELEPHONE ENCOUNTER
Future Appointments:  No future appointments. Last Appointment With Me:  5/10/2022     Requested Prescriptions     Pending Prescriptions Disp Refills    escitalopram oxalate (LEXAPRO) 10 mg tablet 180 Tablet 1     Sig: Take 1 Tablet by mouth two (2) times a day.

## 2022-06-28 ENCOUNTER — TELEPHONE (OUTPATIENT)
Dept: INTERNAL MEDICINE CLINIC | Age: 47
End: 2022-06-28

## 2022-06-28 NOTE — TELEPHONE ENCOUNTER
LVM for patient on home and cell numbers listed in the chart to return call to the office. R/T prescription question.

## 2022-06-29 ENCOUNTER — TELEPHONE (OUTPATIENT)
Dept: INTERNAL MEDICINE CLINIC | Age: 47
End: 2022-06-29

## 2022-06-29 DIAGNOSIS — N95.9 POSTMENOPAUSAL SYMPTOMS: ICD-10-CM

## 2022-06-29 DIAGNOSIS — F41.9 ANXIETY: ICD-10-CM

## 2022-06-29 RX ORDER — ESCITALOPRAM OXALATE 10 MG/1
10 TABLET ORAL 2 TIMES DAILY
Qty: 180 TABLET | Refills: 1 | Status: SHIPPED | OUTPATIENT
Start: 2022-06-29

## 2023-04-21 NOTE — TELEPHONE ENCOUNTER
"Subjective:      Patient ID: Dee Agarwal is a 47 y.o. female.    Vitals:  height is 5' 3" (1.6 m) and weight is 98 kg (216 lb). Her oral temperature is 97.9 °F (36.6 °C). Her blood pressure is 126/82 and her pulse is 84. Her respiration is 17 and oxygen saturation is 96%.     Chief Complaint: Abscess    47-year-old female presents to clinic for evaluation of abscess to abdomen x1 week.  Patient states that of recent, she is been having abscesses, most recently under left arm, and left inner groin.  She states that they have typically just gone away on their own.  She states that this current abscess to her lower abdomen has been present for 1 week, has increased in size, redness, and tenderness.  She denies fever.  She denies drainage from site.  She is awake and alert, answers questions appropriately, no acute distress noted on today's visit.    Abscess  Chronicity:  NewProgression Since Onset: worsened as the day progressed  Abscess location: abdomen.  Associated Symptoms: no fever, no chills  Characteristics: draining, painful, redness and swelling    Treatments Tried:  Warm compresses (prid)  Relieved by:  Nothing  Worsened by:  Nothing    Constitution: Negative for activity change, appetite change, chills, sweating, fatigue and fever.   Cardiovascular:  Negative for chest pain.   Gastrointestinal:  Negative for abdominal pain, nausea and vomiting.   Skin:  Positive for erythema and abscess.    Objective:     Physical Exam   Constitutional: She is oriented to person, place, and time. She appears well-developed.  Non-toxic appearance. She does not appear ill. No distress.   HENT:   Head: Normocephalic and atraumatic. Head is without abrasion, without contusion and without laceration.   Ears:   Right Ear: External ear normal.   Left Ear: External ear normal.   Mouth/Throat: Oropharynx is clear and moist and mucous membranes are normal.   Eyes: Conjunctivae, EOM and lids are normal. Right eye exhibits no " #[id number] pt states she has been having chest fluttering for the past two days and is worried about this. Pt would like an appt for this. Please call to schedule as there are no appts for psr to schedule. discharge. Left eye exhibits no discharge.   Neck: Trachea normal and phonation normal.   Cardiovascular: Normal rate.   Pulmonary/Chest: Effort normal. No respiratory distress.   Abdominal: Normal appearance.   Musculoskeletal: Normal range of motion.         General: Normal range of motion.   Neurological: She is alert and oriented to person, place, and time.   Skin: Skin is warm, dry, intact, not diaphoretic, no rash and abscessed. erythema and lesion No abrasion, No burn, No bruising and No ecchymosis        Psychiatric: Her speech is normal and behavior is normal. Mood, judgment and thought content normal.   Nursing note and vitals reviewed.  Incision & Drainage    Date/Time: 4/20/2023 7:15 PM  Performed by: Chalino Flores NP  Authorized by: Chalino Flores NP     Consent Done?:  Yes (Verbal)    Type:  Abscess  Body area:  Trunk  Location details:  Abdomen  Anesthesia:  Local infiltration  Local anesthetic: Lidocaine 1% without epinephrine  Scalpel size:  11  Incision type:  Single straight  Incision depth: dermal    Complexity:  Simple  Drainage:  Bloody and pus  Drainage amount:  Moderate  Wound treatment:  Expression of material  Patient tolerance:  Patient tolerated the procedure well with no immediate complications    Assessment:     1. Abscess of skin of abdomen        Plan:       Abscess of skin of abdomen  -     mupirocin (BACTROBAN) 2 % ointment; Apply topically 3 (three) times daily.  Dispense: 30 g; Refill: 0  -     sulfamethoxazole-trimethoprim 800-160mg (BACTRIM DS) 800-160 mg Tab; Take 1 tablet by mouth 2 (two) times daily. for 7 days  Dispense: 14 tablet; Refill: 0  -     Incision & Drainage    - discussed wound care.  Monitor for worsening infection.  Complete antibiotics as directed.  Follow-up with PCP.  Return to clinic as needed.  Patient verbalized understanding and is in agreement with plan.    Patient Instructions   - Follow up with your PCP or specialty clinic as directed in the next  1-2 weeks if not improved or as needed.  You can call [phone] to schedule an appointment with the appropriate provider.    - Go to the ER or seek medical attention immediately if you develop new or worsening symptoms.    - You must understand that you have received an Urgent Care treatment only and that you may be released before all of your medical problems are known or treated.   - You, the patient, will arrange for follow up care as instructed.   - If your condition worsens or fails to improve we recommend that you receive another evaluation at the ER immediately or contact your PCP to discuss your concerns or return here.

## 2023-07-24 RX ORDER — ESCITALOPRAM OXALATE 10 MG/1
TABLET ORAL
Qty: 180 TABLET | Refills: 1 | Status: SHIPPED | OUTPATIENT
Start: 2023-07-24